# Patient Record
Sex: FEMALE | Race: WHITE | NOT HISPANIC OR LATINO | URBAN - METROPOLITAN AREA
[De-identification: names, ages, dates, MRNs, and addresses within clinical notes are randomized per-mention and may not be internally consistent; named-entity substitution may affect disease eponyms.]

---

## 2022-06-29 ENCOUNTER — EMERGENCY (EMERGENCY)
Facility: HOSPITAL | Age: 37
LOS: 1 days | Discharge: HOME | End: 2022-06-29
Attending: EMERGENCY MEDICINE
Payer: SELF-PAY

## 2022-06-29 VITALS
RESPIRATION RATE: 17 BRPM | SYSTOLIC BLOOD PRESSURE: 128 MMHG | HEART RATE: 83 BPM | OXYGEN SATURATION: 98 % | DIASTOLIC BLOOD PRESSURE: 84 MMHG | TEMPERATURE: 99 F | HEIGHT: 62 IN | WEIGHT: 136.69 LBS

## 2022-06-29 LAB
APPEARANCE UR: CLEAR — SIGNIFICANT CHANGE UP
BILIRUB UR-MCNC: NEGATIVE — SIGNIFICANT CHANGE UP
COLOR SPEC: YELLOW — SIGNIFICANT CHANGE UP
DIFF PNL FLD: ABNORMAL
GLUCOSE UR QL: NEGATIVE — SIGNIFICANT CHANGE UP
HCG UR QL: NEGATIVE — SIGNIFICANT CHANGE UP
KETONES UR-MCNC: NEGATIVE — SIGNIFICANT CHANGE UP
LEUKOCYTE ESTERASE UR-ACNC: ABNORMAL
NITRITE UR-MCNC: NEGATIVE — SIGNIFICANT CHANGE UP
PH UR: 7 — SIGNIFICANT CHANGE UP (ref 5–8)
PROT UR-MCNC: 15
SP GR SPEC: 1.01 — SIGNIFICANT CHANGE UP (ref 1.01–1.02)
UROBILINOGEN FLD QL: NEGATIVE — SIGNIFICANT CHANGE UP

## 2022-06-29 PROCEDURE — 99283 EMERGENCY DEPT VISIT LOW MDM: CPT

## 2022-06-29 PROCEDURE — 87186 SC STD MICRODIL/AGAR DIL: CPT

## 2022-06-29 PROCEDURE — 87086 URINE CULTURE/COLONY COUNT: CPT

## 2022-06-29 PROCEDURE — 99053 MED SERV 10PM-8AM 24 HR FAC: CPT

## 2022-06-29 PROCEDURE — 81025 URINE PREGNANCY TEST: CPT

## 2022-06-29 PROCEDURE — 81001 URINALYSIS AUTO W/SCOPE: CPT

## 2022-06-29 RX ORDER — ACETAMINOPHEN 500 MG
975 TABLET ORAL ONCE
Refills: 0 | Status: COMPLETED | OUTPATIENT
Start: 2022-06-29 | End: 2022-06-29

## 2022-06-29 RX ORDER — CEFUROXIME AXETIL 250 MG
1 TABLET ORAL
Qty: 14 | Refills: 0
Start: 2022-06-29 | End: 2022-07-05

## 2022-06-29 RX ORDER — CEFUROXIME AXETIL 250 MG
250 TABLET ORAL ONCE
Refills: 0 | Status: COMPLETED | OUTPATIENT
Start: 2022-06-29 | End: 2022-06-29

## 2022-06-29 RX ORDER — ACETAMINOPHEN 500 MG
2 TABLET ORAL
Qty: 20 | Refills: 0
Start: 2022-06-29

## 2022-06-29 RX ORDER — PHENAZOPYRIDINE HCL 100 MG
1 TABLET ORAL
Qty: 6 | Refills: 0
Start: 2022-06-29 | End: 2022-06-30

## 2022-06-29 RX ADMIN — Medication 250 MILLIGRAM(S): at 02:30

## 2022-06-29 NOTE — ED PROVIDER NOTE - NSFOLLOWUPCLINICS_GEN_ALL_ED_FT
[facility]  Internal Medicine  [address] [place] [address]  Phone: [phone]  Fax: [phone]    [facility]  Urology  [address] [place] [address]  Phone: [phone]  Fax: [phone]

## 2022-06-29 NOTE — ED PROVIDER NOTE - OBJECTIVE STATEMENT
Chief complaint of dysuria since yesterday. No fever, no vomiting, no vaginal bleeding. No flank pain.

## 2022-06-29 NOTE — ED ADULT TRIAGE NOTE - CHIEF COMPLAINT QUOTE
c/o pelvic pain and on and off uti since november , today I have burning when urinating, and having chills

## 2022-06-29 NOTE — ED PROVIDER NOTE - PATIENT PORTAL LINK FT
You can access the FollowMyHealth Patient Portal offered by [facility] by registering at the following website: [email or web address]. By joining [employer]’s FollowMyHealth portal, you will also be able to view your health information using other applications (apps) compatible with our system.

## 2022-06-29 NOTE — ED PROVIDER NOTE - NSFOLLOWUPINSTRUCTIONS_ED_ALL_ED_FT
A urinary tract infection (UTI) is an infection of any part of the urinary tract. The urinary tract includes the kidneys, ureters, bladder, and urethra. These organs make, store, and get rid of urine in the body.    An upper UTI affects the ureters and kidneys. A lower UTI affects the bladder and urethra.      What are the causes?    Most urinary tract infections are caused by bacteria in your genital area around your urethra, where urine leaves your body. These bacteria grow and cause inflammation of your urinary tract.      What increases the risk?    You are more likely to develop this condition if:  •You have a urinary catheter that stays in place.      •You are not able to control when you urinate or have a bowel movement (incontinence).    •You are female and you:  •Use a spermicide or diaphragm for birth control.      •Have low estrogen levels.      •Are pregnant.        •You have certain genes that increase your risk.      •You are sexually active.      •You take antibiotic medicines.    •You have a condition that causes your flow of urine to slow down, such as:  •An enlarged prostate, if you are male.      •Blockage in your urethra.      •A kidney stone.      •A nerve condition that affects your bladder control (neurogenic bladder).      •Not getting enough to drink, or not urinating often.      •You have certain medical conditions, such as:  •Diabetes.      •A weak disease-fighting system (immunesystem).      •Sickle cell disease.      •Gout.      •Spinal cord injury.          What are the signs or symptoms?    Symptoms of this condition include:  •Needing to urinate right away (urgency).      •Frequent urination. This may include small amounts of urine each time you urinate.      •Pain or burning with urination.      •Blood in the urine.      •Urine that smells bad or unusual.      •Trouble urinating.      •Cloudy urine.      •Vaginal discharge, if you are female.      •Pain in the abdomen or the lower back.      You may also have:  •Vomiting or a decreased appetite.      •Confusion.      •Irritability or tiredness.      •A fever or chills.      •Diarrhea.      The first symptom in older adults may be confusion. In some cases, they may not have any symptoms until the infection has worsened.      How is this diagnosed?    This condition is diagnosed based on your medical history and a physical exam. You may also have other tests, including:  •Urine tests.      •Blood tests.      •Tests for STIs (sexually transmitted infections).      If you have had more than one UTI, a cystoscopy or imaging studies may be done to determine the cause of the infections.      How is this treated?    Treatment for this condition includes:  •Antibiotic medicine.      •Over-the-counter medicines to treat discomfort.      •Drinking enough water to stay hydrated.      If you have frequent infections or have other conditions such as a kidney stone, you may need to see a health care provider who specializes in the urinary tract (urologist).    In rare cases, urinary tract infections can cause sepsis. Sepsis is a life-threatening condition that occurs when the body responds to an infection. Sepsis is treated in the hospital with IV antibiotics, fluids, and other medicines.      Follow these instructions at home:     Medicines     •Take over-the-counter and prescription medicines only as told by your health care provider.      •If you were prescribed an antibiotic medicine, take it as told by your health care provider. Do not stop using the antibiotic even if you start to feel better.      General instructions   •Make sure you:  •Empty your bladder often and completely. Do not hold urine for long periods of time.      •Empty your bladder after sex.      •Wipe from front to back after urinating or having a bowel movement if you are female. Use each tissue only one time when you wipe.        •Drink enough fluid to keep your urine pale yellow.      •Keep all follow-up visits. This is important.        Contact a health care provider if:    •Your symptoms do not get better after 1–2 days.      •Your symptoms go away and then return.        Get help right away if:    •You have severe pain in your back or your lower abdomen.      •You have a fever or chills.      •You have nausea or vomiting.        Summary    •A urinary tract infection (UTI) is an infection of any part of the urinary tract, which includes the kidneys, ureters, bladder, and urethra.      •Most urinary tract infections are caused by bacteria in your genital area.      •Treatment for this condition often includes antibiotic medicines.      •If you were prescribed an antibiotic medicine, take it as told by your health care provider. Do not stop using the antibiotic even if you start to feel better.      •Keep all follow-up visits. This is important.      This information is not intended to replace advice given to you by your health care provider. Make sure you discuss any questions you have with your health care provider.

## 2022-06-29 NOTE — ED PROVIDER NOTE - SKIN NEGATIVE STATEMENT, MLM
no abrasions, no jaundice, no lesions, no pruritis, and no rashes. Where Do You Want The Question To Include Opioid Counseling Located?: Case Summary Tab

## 2022-06-29 NOTE — ED PROVIDER NOTE - CLINICAL SUMMARY MEDICAL DECISION MAKING FREE TEXT BOX
Pt is well appearing, no vomiting, able to tolerate PO meds.   Rx Ceftin, Pyridium, Tylenol.  Pt is well appearing, has no new complaints and able to walk with normal gait. Pt is stable for discharge and follow up with medical doctor. Pt educated on care and need for follow up. Discussed anticipatory guidance and return precautions. Questions answered. I had a detailed discussion with the patient and/or guardian regarding the historical points, exam findings, and any diagnostic results supporting the discharge diagnosis.

## 2023-01-23 NOTE — ED ADULT NURSE NOTE - IS THE PATIENT ABLE TO BE SCREENED?
"Chief Complaint   Patient presents with    STD CHECK       HPI:  Ruben Mas is a 36 y.o. female patient  who presents today requesting STD screening.  Denies abnormal discharge, itching, or odor.  No pelvic pain.  She is currently on [place] extended OCPs with regular monthly menses every 3 months, lasting 4 days in duration, without intermenstrual bleeding. BP today was elevated at 138/100.  Reports that home BP monitoring is also mildly elevated.   Patient's last menstrual period was 2022 (approximate).    Blood pressure (!) 138/100, height 5' 5" (1.651 m), weight 82 kg (180 lb 12.4 oz), last menstrual period 2022.      3/24/22 Pap: Negative    Past Medical History:   Diagnosis Date    Abnormal Pap smear of cervix        Past Surgical History:   Procedure Laterality Date    UMBILICAL HERNIA REPAIR           ROS:  GENERAL: Feeling well overall.   SKIN: Denies rash or lesions.   HEAD: Denies head injury or headache.   NODES: Denies enlarged lymph nodes.   CHEST: Denies chest pain or shortness of breath.   CARDIOVASCULAR: Denies palpitations or left sided chest pain.   ABDOMEN: No abdominal pain, nausea, vomiting or rectal bleeding.   URINARY: No dysuria or hematuria.  REPRODUCTIVE: See HPI.   BREASTS: Denies pain, lumps, or nipple discharge.   HEMATOLOGIC: No easy bruisability or excessive bleeding.   MUSCULOSKELETAL: Denies joint pain or swelling.   NEUROLOGIC: Denies syncope or weakness.   PSYCHIATRIC: Denies depression.    PE:   (chaperone present during entire exam)  APPEARANCE: Well nourished, well developed, in no acute distress.  ABDOMEN: Soft. No tenderness or masses.   VULVA: No lesions. Normal female genitalia.  URETHRAL MEATUS: Normal size and location, no lesions, no prolapse.  VAGINA: Moist and well rugated, no abnormal discharge, no significant cystocele or rectocele.  CERVIX: No lesions and discharge.       Diagnosis:  1. Screen for STD (sexually transmitted disease)    2. " Encounter for surveillance of contraceptive pills          PLAN:    Orders Placed This Encounter    C. trachomatis/N. gonorrhoeae by AMP DNA [facility]; Cervix    VAGINOSIS SCREEN BY DNA PROBE    HIV 1/2 Ag/Ab (4th Gen)    RPR    Hepatitis Panel, Acute       Patient was counseled today on STD screening.  We also discussed her elevated BP and usage of OCPs.  She was encouraged to visit with her PCP for evaluation.  If her BP remains elevated, she may need to convert to a progesterone only pill or another method of contraception.    Follow-up after testing.    I spent a total of 22 minutes on the day of the visit.This includes face to face time and non-face to face time preparing to see the patient (eg, review of tests), Obtaining and/or reviewing separately obtained history, Documenting clinical information in the electronic or other health record, Independently interpreting resultsand communicating results to the patient/family/caregiver, or Care coordination.    Answers submitted by the patient for this visit:  Exposure to STD Questionnaire  (Submitted on 1/22/2023)  Chief Complaint: STD exposure  discharge: No  genital itching: No  genital rash: No  dyspareunia: No  genital lesions: No  pelvic pain: No  dysuria: No  Chronicity: new  Onset: today  Progression since onset: resolved  abdominal pain: No  anorexia: No  diaphoresis: No  fever: No  genital odor: Yes  rectal pain: No  sore throat: No  frequency: No  treatments tried: nothing  Improvement on treatment: no relief  risk factors: multiple sexual partners     Yes